# Patient Record
Sex: FEMALE | Race: WHITE | Employment: STUDENT | URBAN - METROPOLITAN AREA
[De-identification: names, ages, dates, MRNs, and addresses within clinical notes are randomized per-mention and may not be internally consistent; named-entity substitution may affect disease eponyms.]

---
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
## 2021-06-16 ENCOUNTER — HOSPITAL ENCOUNTER (OUTPATIENT)
Age: 21
Discharge: HOME | End: 2021-06-16
Payer: MEDICAID

## 2021-06-16 VITALS
HEIGHT: 64 IN | DIASTOLIC BLOOD PRESSURE: 66 MMHG | WEIGHT: 155 LBS | TEMPERATURE: 99 F | OXYGEN SATURATION: 100 % | RESPIRATION RATE: 18 BRPM | HEART RATE: 56 BPM | BODY MASS INDEX: 26.46 KG/M2 | SYSTOLIC BLOOD PRESSURE: 132 MMHG

## 2021-06-16 DIAGNOSIS — J02.9 VIRAL PHARYNGITIS: Primary | ICD-10-CM

## 2021-06-16 PROCEDURE — 99213 OFFICE O/P EST LOW 20 MIN: CPT | Performed by: NURSE PRACTITIONER

## 2021-06-16 PROCEDURE — 87880 STREP A ASSAY W/OPTIC: CPT | Performed by: NURSE PRACTITIONER

## 2021-06-16 NOTE — ED PROVIDER NOTES
Patient presents with:  Sore Throat      HPI:     Cari Frye is a 22year old female who presents for evaluation of a chief complaint of sore throat that she woke up with today. No difficulty swallowing. Speech clear. No fevers or chills.  No cough or c Communication with Friends and Family:       Frequency of Social Gatherings with Friends and Family:       Attends [employer] Services:       Active Member of Clubs or Organizations:       Attends Club or Organization Meetings:       Marital Status:   Intim today.    Follow Up with:  Taras Sorensen, [address]  [phone]    Schedule an appointment as soon as possible for a visit in 3 days

## 2023-01-27 ENCOUNTER — HOSPITAL ENCOUNTER (OUTPATIENT)
Age: 23
Discharge: HOME | End: 2023-01-27
Payer: MEDICAID

## 2023-01-27 VITALS
TEMPERATURE: 99 F | BODY MASS INDEX: 29.23 KG/M2 | RESPIRATION RATE: 16 BRPM | DIASTOLIC BLOOD PRESSURE: 63 MMHG | HEART RATE: 88 BPM | OXYGEN SATURATION: 100 % | HEIGHT: 63 IN | SYSTOLIC BLOOD PRESSURE: 121 MMHG | WEIGHT: 165 LBS

## 2023-01-27 DIAGNOSIS — G44.89 OTHER HEADACHE SYNDROME: Primary | ICD-10-CM

## 2023-01-27 DIAGNOSIS — Z20.822 ENCOUNTER FOR LABORATORY TESTING FOR COVID-19 VIRUS: ICD-10-CM

## 2023-01-27 DIAGNOSIS — R05.1 ACUTE COUGH: ICD-10-CM

## 2023-01-27 LAB
B-HCG UR QL: NEGATIVE
POCT INFLUENZA A: NEGATIVE
POCT INFLUENZA B: NEGATIVE
SARS-COV-2 RNA RESP QL NAA+PROBE: NOT DETECTED

## 2023-01-27 PROCEDURE — U0002 COVID-19 LAB TEST NON-CDC: HCPCS | Performed by: PHYSICIAN ASSISTANT

## 2023-01-27 PROCEDURE — 81025 URINE PREGNANCY TEST: CPT | Performed by: EMERGENCY MEDICINE

## 2023-01-27 PROCEDURE — 87502 INFLUENZA DNA AMP PROBE: CPT | Performed by: EMERGENCY MEDICINE

## 2023-01-27 PROCEDURE — 99213 OFFICE O/P EST LOW 20 MIN: CPT | Performed by: EMERGENCY MEDICINE

## 2023-01-27 RX ORDER — METOCLOPRAMIDE 10 MG/1
10 TABLET ORAL 3 TIMES DAILY PRN
Qty: 20 TABLET | Refills: 0 | Status: SHIPPED | OUTPATIENT
Start: 2023-01-27 | End: 2023-02-26

## 2023-01-27 RX ORDER — DIAZEPAM 5 MG/1
5 TABLET ORAL NIGHTLY PRN
Qty: 12 TABLET | Refills: 0 | Status: SHIPPED | OUTPATIENT
Start: 2023-01-27

## 2023-01-27 RX ORDER — IBUPROFEN 600 MG/1
600 TABLET ORAL ONCE
Status: COMPLETED | OUTPATIENT
Start: 2023-01-27 | End: 2023-01-27

## 2023-01-27 NOTE — DISCHARGE INSTRUCTIONS
Reglan sent to the pharmacy as needed for breakthrough headache pain. He can take Valium 5 to 10 mg as needed for breakthrough pain. This medication may cause drowsiness/sleepiness. No driving or working while on this medication. Work note provided for today. Go to the ER for any new or worsening symptoms.

## 2023-02-08 ENCOUNTER — HOSPITAL ENCOUNTER (OUTPATIENT)
Age: 23
Discharge: HOME | End: 2023-02-08
Payer: MEDICAID

## 2023-02-08 VITALS
WEIGHT: 158 LBS | TEMPERATURE: 99 F | SYSTOLIC BLOOD PRESSURE: 137 MMHG | DIASTOLIC BLOOD PRESSURE: 73 MMHG | RESPIRATION RATE: 18 BRPM | HEIGHT: 63 IN | HEART RATE: 95 BPM | OXYGEN SATURATION: 100 % | BODY MASS INDEX: 28 KG/M2

## 2023-02-08 DIAGNOSIS — K52.9 GASTROENTERITIS: Primary | ICD-10-CM

## 2023-02-08 LAB
#MXD IC: 0.4 X10ˆ3/UL (ref 0.1–1)
HCT VFR BLD AUTO: 38.7 %
HGB BLD-MCNC: 12.7 G/DL
LYMPHOCYTES # BLD AUTO: 1.2 X10ˆ3/UL (ref 1–4)
LYMPHOCYTES NFR BLD AUTO: 20.2 %
MCH RBC QN AUTO: 27.9 PG (ref 26–34)
MCHC RBC AUTO-ENTMCNC: 32.8 G/DL (ref 31–37)
MCV RBC AUTO: 84.9 FL (ref 80–100)
MIXED CELL %: 6.4 %
NEUTROPHILS # BLD AUTO: 4.2 X10ˆ3/UL (ref 1.5–7.7)
NEUTROPHILS NFR BLD AUTO: 73.4 %
PLATELET # BLD AUTO: 254 X10ˆ3/UL (ref 150–450)
RBC # BLD AUTO: 4.56 X10ˆ6/UL
WBC # BLD AUTO: 5.8 X10ˆ3/UL (ref 4–11)

## 2023-02-08 PROCEDURE — 85025 COMPLETE CBC W/AUTO DIFF WBC: CPT | Performed by: NURSE PRACTITIONER

## 2023-02-08 PROCEDURE — 99213 OFFICE O/P EST LOW 20 MIN: CPT | Performed by: NURSE PRACTITIONER

## 2023-02-08 NOTE — DISCHARGE INSTRUCTIONS
Push fluids  Edmonson diet  If you develop anymore episodes of blood in your vomit, fever, abdominal pain or worsening symptoms, please go to the emergency room  Please follow up with primary care provider in 1 week if no improvement, a referral was placed for you

## 2023-02-08 NOTE — ED INITIAL ASSESSMENT (HPI)
Pt reports vomiting saliva and mucus in the morning when brushing her teeth (which happens often). This morning, she noted blood in the saliva, but denies that her teeth were bleeding. Pt states that the nausea did not subside.

## 2023-12-06 ENCOUNTER — HOSPITAL ENCOUNTER (OUTPATIENT)
Age: 23
Discharge: HOME | End: 2023-12-06
Payer: MEDICAID

## 2023-12-06 VITALS
SYSTOLIC BLOOD PRESSURE: 113 MMHG | TEMPERATURE: 98 F | RESPIRATION RATE: 18 BRPM | OXYGEN SATURATION: 100 % | HEART RATE: 85 BPM | DIASTOLIC BLOOD PRESSURE: 51 MMHG

## 2023-12-06 DIAGNOSIS — J10.1 INFLUENZA B: Primary | ICD-10-CM

## 2023-12-06 LAB
POCT INFLUENZA A: NEGATIVE
POCT INFLUENZA B: POSITIVE
S PYO AG THROAT QL IA.RAPID: NEGATIVE
SARS-COV-2 RNA RESP QL NAA+PROBE: NOT DETECTED

## 2023-12-06 PROCEDURE — 87502 INFLUENZA DNA AMP PROBE: CPT | Performed by: PHYSICIAN ASSISTANT

## 2023-12-06 PROCEDURE — 87651 STREP A DNA AMP PROBE: CPT | Performed by: PHYSICIAN ASSISTANT

## 2023-12-06 PROCEDURE — 99213 OFFICE O/P EST LOW 20 MIN: CPT

## 2023-12-06 PROCEDURE — 99212 OFFICE O/P EST SF 10 MIN: CPT

## 2023-12-06 NOTE — ED INITIAL ASSESSMENT (HPI)
Presents with headache, sore throat, cough, and congestion for 4 days. No fever. Home covid test negative.

## (undated) NOTE — ED AVS SNAPSHOT
[facility] Immediate Care in [facility] [address].  [address] [facility]    Phone:  [phone]    Fax:  [phone]           Aleda Estimable   MRN: [id number]    Department:  [facility] Immediate Care in [address] [place] [address]   Date of Visit: and physician's office to determine coverage and benefits available for follow-up care and referrals. It is our goal to assure that you are completely satisfied with every aspect of your visit today.   In an effort to constantly improve our service to y Any imaging studies and labs completed today can be reviewed in your MyChart account. You may have had testing done that requires us to contact you. Please make sure we have your correct phone number on file.      OUR CURRENT HOURS OF OPERATION:  MONDAY T and ask to get set up for an insurance coverage that is in-network with [facility] [employer]. [employer]     Sign up for [employer] access for your child.   [employer] access allows you to view health information for your child from their recent   visit, vi

## (undated) NOTE — LETTER
Date & Time: 9/15/2018, 5:40 PM  Patient: Rossy Newell  Encounter Provider(s):    Maxime Bustamante MD       To Whom It May Concern: Rossy Newell was seen and treated in our department on 9/15/2018.  She can return to work with these limitations: M

## (undated) NOTE — LETTER
Date & Time: 1/27/2023, 4:26 PM  Patient: Carmen Fong  Encounter Provider(s):    DEB Genao       To Whom It May Concern: Carmen Fong was seen and treated in our department on 1/27/2023. She can return to work 01/30/2023. Not covid related.      DEB Bruno, 01/27/23, 4:26 PM

## (undated) NOTE — LETTER
Date & Time: 9/15/2018, 5:37 PM  Patient: Jeronimo Patterson  Encounter Provider(s):    Carla Abernathy MD       To Whom It May Concern: Jeronimo Patterson was seen and treated in our department on 9/15/2018.  She should not participate in gym/sports until 2

## (undated) NOTE — LETTER
State of [facility] [address] of Child Health Examination       Student's Name  [place] Birth [place] Title                           Date     Signature HEALTH HISTORY          TO BE COMPLETED AND SIGNED BY PARENT/GUARDIAN AND VERIFIED BY HEALTH CARE PROVIDER    ALLERGIES  (Food, drug, insect, other)  Patient has no known allergies.  MEDICATION  (List all prescribed or taken on a regular basis.)    Current Date     PHYSICAL EXAMINATION REQUIREMENTS    Entire section below to be completed by MD//APN/PA       PHYSICAL EXAMINATION REQUIREMENTS (head circumference if <33 years old):   /67   Ht 5' 3.5\" (1.613 m) Throat Yes  Musculoskeletal Yes    Mouth/Dental Yes  Spinal examination Yes    Cardiovascular/HTN Yes  Nutritional status Yes    Respiratory Yes                   Diagnosis of Asthma: No Mental Health Yes        Currently Prescribed Asthma Medication:

## (undated) NOTE — LETTER
State of [facility] [address] of Child Health Examination       Student's Name  [place] Birth [place] Title                           Date     Signature HEALTH HISTORY          TO BE COMPLETED AND SIGNED BY PARENT/GUARDIAN AND VERIFIED BY HEALTH CARE PROVIDER    ALLERGIES  (Food, drug, insect, other) MEDICATION  (List all prescribed or taken on a regular basis.)     Diagnosis of asthma?   Child wakes during DIABETES SCREENING  BMI>85% age/sex  Yes And any two of the following:  Family History No   Ethnic Minority  Yes          Signs of Insulin Resistance (hypertension, dyslipidemia, polycystic ovarian syndrome, acanthosis nigricans)    No           At Risk  N Quick-relief  medication (e.g. Short Acting Beta Antagonist): No          Controller medication (e.g. inhaled corticosteroid):   No Other   NEEDS/MODIFICATIONS required in the school setting  None DIETARY Needs/Restrictions     None   SPECIAL INSTR

## (undated) NOTE — LETTER
VACCINE ADMINISTRATION RECORD  PARENT / GUARDIAN APPROVAL  Date: 3/26/2018  Vaccine administered to:  Gela Rick     : 2000    MRN: [id number]    A copy of the appropriate Centers for Disease Control and Prevention Vaccine Information statement h

## (undated) NOTE — ED AVS SNAPSHOT
[facility] Immediate Care in [address] [address] [address]    Phone:  [phone]    Fax:  [phone]           Patsy Akosua   MRN: [id number]    Department:  [facility] Immediate Care in [address] [place] [address]   Date of Visit:  2/ physician may seek payment directly from you for amounts other than your deductible, co-payment, or co-insurance and for other services not covered under your health insurance plan.   Please contact your insurance company and physician's office to determine different from what your Primary Care doctor has instructed you - please continue to take your medications as instructed by your Primary Care doctor until you can check with your doctor. Please bring the medication list to your next doctor's appointment. coverage. Patient [address] is a Federal Navigator program that can help with your Affordable Care Act coverage, as well as all types of Medicaid plans.   To get signed up and covered, please call [phone] and ask to get set up for an insuran

## (undated) NOTE — Clinical Note
State of [facility] [address] of Child Health Examination       Student's Name  [place] Birth [place] Title                           Date    (If adding dates to the above immunization history section, put your initials by date(s) and sign here.)   ALTERNATIVE PROOF OF IMMUNITY   1.Clinical diagnosis (measles, mumps, hepatits B) is allowed when verified b Yes       No    Loss of function of one of paired organs? (eye/ear/kidney/testicle)   Yes       No      Birth Defects? Developmental delay? Yes       No    Yes       No  Hospitalizations? When? What for? Yes       No    Blood disorders?   Hemophili ovarian syndrome, acanthosis nigricans)                           At Risk     Lead Risk Questionnaire  Req'd for children 6 months thru 6 yrs enrolled in licensed or public school operated day care, ,  nursery school and/or  (blood thang SPECIAL INSTRUCTIONS/DEVICES e.g. safety glasses, glass eye, chest protector for arrhythmia, pacemaker, prosthetic device, dental bridge, false teeth, athleticsupport/cup     None   MENTAL HEALTH/OTHER   Is there anything else the school should know about